# Patient Record
Sex: MALE | Race: BLACK OR AFRICAN AMERICAN | NOT HISPANIC OR LATINO | URBAN - METROPOLITAN AREA
[De-identification: names, ages, dates, MRNs, and addresses within clinical notes are randomized per-mention and may not be internally consistent; named-entity substitution may affect disease eponyms.]

---

## 2021-05-23 ENCOUNTER — EMERGENCY (EMERGENCY)
Facility: HOSPITAL | Age: 31
LOS: 1 days | Discharge: HOME | End: 2021-05-23
Admitting: EMERGENCY MEDICINE
Payer: SELF-PAY

## 2021-05-23 VITALS
HEART RATE: 79 BPM | OXYGEN SATURATION: 100 % | RESPIRATION RATE: 16 BRPM | TEMPERATURE: 98 F | DIASTOLIC BLOOD PRESSURE: 100 MMHG | SYSTOLIC BLOOD PRESSURE: 145 MMHG

## 2021-05-23 VITALS
OXYGEN SATURATION: 100 % | DIASTOLIC BLOOD PRESSURE: 84 MMHG | SYSTOLIC BLOOD PRESSURE: 136 MMHG | TEMPERATURE: 99 F | RESPIRATION RATE: 18 BRPM | HEART RATE: 93 BPM

## 2021-05-23 PROCEDURE — 99284 EMERGENCY DEPT VISIT MOD MDM: CPT

## 2021-05-23 RX ORDER — HALOPERIDOL DECANOATE 100 MG/ML
5 INJECTION INTRAMUSCULAR ONCE
Refills: 0 | Status: COMPLETED | OUTPATIENT
Start: 2021-05-23 | End: 2021-05-23

## 2021-05-23 RX ADMIN — HALOPERIDOL DECANOATE 5 MILLIGRAM(S): 100 INJECTION INTRAMUSCULAR at 15:50

## 2021-05-23 NOTE — ED ADULT NURSE NOTE - OBJECTIVE STATEMENT
Pt undomiciled, arrived to  reporting that he dislikes his shelter. Pt presents calm and cooperative. Denies S/I H/I A/H V/H. Pt wanded for safety. Pt in  lo acuity pending evaluation.

## 2021-05-23 NOTE — PROVIDER CONTACT NOTE (OTHER) - ASSESSMENT
Medicine SW contacted by  FAVIAN Raman who requests a metrocard for  Pt informing Pt is cleared for discharged, Pt understands travel by public transportation.  Medicine MCKAY provided metrocard to  NP.  No further Medicine SW intervention requested at this time.

## 2021-05-23 NOTE — ED PROVIDER NOTE - CLINICAL SUMMARY MEDICAL DECISION MAKING FREE TEXT BOX
29 y/o M  hx Schizophrenia   Medication offered. Tolerated same well.  Medical evaluation performed. There is no clinical evidence of intoxication or any acute medical problem requiring immediate intervention. List of resources consulted.  SW consulted.

## 2021-05-23 NOTE — ED PROVIDER NOTE - PATIENT PORTAL LINK FT
You can access the FollowMyHealth Patient Portal offered by [facility] by registering at the following website: [email or web address]. By joining [employer]’s FollowMyHealth portal, you will also be able to view your health information using other applications (apps) compatible with our system.

## 2021-05-23 NOTE — ED ADULT TRIAGE NOTE - CHIEF COMPLAINT QUOTE
Patient [place] ems [place] a park , patient h/o schizophrenia , states " I am not feeling myself" mentally unwell".

## 2021-05-23 NOTE — ED BEHAVIORAL HEALTH NOTE - BEHAVIORAL HEALTH NOTE
Pt medically cleared for d/c. Pt educated and provided shelter resources. Pt receptive and accepted. No further interventions at this time. SW will remain available as needed

## 2021-05-23 NOTE — ED PROVIDER NOTE - OBJECTIVE STATEMENT
29 y/o M  hx Schizophrenia BIBA secondary to Agitation.  Admits to not wanting to be at his shelter any longer . States " They are stealing my stuff there".   Denies any physical altercation.  Explicitly  denies SI/HI/AH/VH. Denies falling,  punching or kicking any objects. Denies pain, SOB, fever, chills, chest/abdominal discomfort. Denies  recent use of alcohol or  illicit drugs. No evidence of physical injuries, broken  skin or deformities.

## 2021-05-23 NOTE — ED PROVIDER NOTE - NSFOLLOWUPCLINICS_GEN_ALL_ED_FT
[facility]  Behavioral Health  [id number] [place] [address]  Phone: [phone]  Fax:
